# Patient Record
Sex: FEMALE | Race: WHITE | NOT HISPANIC OR LATINO | Employment: UNEMPLOYED | URBAN - NONMETROPOLITAN AREA
[De-identification: names, ages, dates, MRNs, and addresses within clinical notes are randomized per-mention and may not be internally consistent; named-entity substitution may affect disease eponyms.]

---

## 2017-01-11 ENCOUNTER — TRANSCRIBE ORDERS (OUTPATIENT)
Dept: ADMINISTRATIVE | Facility: HOSPITAL | Age: 37
End: 2017-01-11

## 2017-01-11 ENCOUNTER — HOSPITAL ENCOUNTER (OUTPATIENT)
Dept: GENERAL RADIOLOGY | Facility: HOSPITAL | Age: 37
Discharge: HOME | End: 2017-01-11
Admitting: PHYSICIAN ASSISTANT

## 2017-01-11 DIAGNOSIS — M54.5 LOW BACK PAIN, UNSPECIFIED BACK PAIN LATERALITY, UNSPECIFIED CHRONICITY, WITH SCIATICA PRESENCE UNSPECIFIED: Primary | ICD-10-CM

## 2017-01-11 DIAGNOSIS — M54.5 LOW BACK PAIN, UNSPECIFIED BACK PAIN LATERALITY, UNSPECIFIED CHRONICITY, WITH SCIATICA PRESENCE UNSPECIFIED: ICD-10-CM

## 2017-01-11 PROCEDURE — 72110 X-RAY EXAM L-2 SPINE 4/>VWS: CPT | Performed by: RADIOLOGY

## 2017-01-11 PROCEDURE — 72110 X-RAY EXAM L-2 SPINE 4/>VWS: CPT

## 2017-12-11 ENCOUNTER — HOSPITAL ENCOUNTER (OUTPATIENT)
Dept: GENERAL RADIOLOGY | Facility: HOSPITAL | Age: 37
Discharge: HOME | End: 2017-12-11
Admitting: NURSE PRACTITIONER

## 2017-12-11 ENCOUNTER — TRANSCRIBE ORDERS (OUTPATIENT)
Dept: ADMINISTRATIVE | Facility: HOSPITAL | Age: 37
End: 2017-12-11

## 2017-12-11 DIAGNOSIS — M54.9 BACK PAIN, UNSPECIFIED BACK LOCATION, UNSPECIFIED BACK PAIN LATERALITY, UNSPECIFIED CHRONICITY: ICD-10-CM

## 2017-12-11 DIAGNOSIS — M54.9 BACK PAIN, UNSPECIFIED BACK LOCATION, UNSPECIFIED BACK PAIN LATERALITY, UNSPECIFIED CHRONICITY: Primary | ICD-10-CM

## 2017-12-11 PROCEDURE — 72202 X-RAY EXAM SI JOINTS 3/> VWS: CPT | Performed by: RADIOLOGY

## 2017-12-11 PROCEDURE — 72202 X-RAY EXAM SI JOINTS 3/> VWS: CPT

## 2019-12-30 ENCOUNTER — HOSPITAL ENCOUNTER (EMERGENCY)
Facility: HOSPITAL | Age: 39
Discharge: HOME | End: 2019-12-30
Attending: FAMILY MEDICINE | Admitting: FAMILY MEDICINE

## 2019-12-30 ENCOUNTER — APPOINTMENT (OUTPATIENT)
Dept: GENERAL RADIOLOGY | Facility: HOSPITAL | Age: 39
End: 2019-12-30

## 2019-12-30 VITALS
WEIGHT: 215 LBS | HEART RATE: 98 BPM | HEIGHT: 64 IN | OXYGEN SATURATION: 98 % | DIASTOLIC BLOOD PRESSURE: 79 MMHG | SYSTOLIC BLOOD PRESSURE: 116 MMHG | TEMPERATURE: 98.5 F | BODY MASS INDEX: 36.7 KG/M2 | RESPIRATION RATE: 18 BRPM

## 2019-12-30 DIAGNOSIS — S90.111A CONTUSION OF RIGHT GREAT TOE WITHOUT DAMAGE TO NAIL, INITIAL ENCOUNTER: Primary | ICD-10-CM

## 2019-12-30 DIAGNOSIS — S90.121A CONTUSION OF SECOND TOE OF RIGHT FOOT, INITIAL ENCOUNTER: ICD-10-CM

## 2019-12-30 PROCEDURE — 73630 X-RAY EXAM OF FOOT: CPT

## 2019-12-30 PROCEDURE — 99283 EMERGENCY DEPT VISIT LOW MDM: CPT

## 2019-12-30 PROCEDURE — 73630 X-RAY EXAM OF FOOT: CPT | Performed by: RADIOLOGY

## 2019-12-30 RX ORDER — IBUPROFEN 400 MG/1
800 TABLET ORAL ONCE
Status: COMPLETED | OUTPATIENT
Start: 2019-12-30 | End: 2019-12-30

## 2019-12-30 RX ORDER — IBUPROFEN 800 MG/1
800 TABLET ORAL EVERY 8 HOURS PRN
Qty: 30 TABLET | Refills: 0 | OUTPATIENT
Start: 2019-12-30 | End: 2022-03-09

## 2019-12-30 RX ADMIN — IBUPROFEN 800 MG: 400 TABLET, FILM COATED ORAL at 12:52

## 2022-03-09 ENCOUNTER — APPOINTMENT (OUTPATIENT)
Dept: GENERAL RADIOLOGY | Facility: HOSPITAL | Age: 42
End: 2022-03-09

## 2022-03-09 ENCOUNTER — HOSPITAL ENCOUNTER (EMERGENCY)
Facility: HOSPITAL | Age: 42
Discharge: HOME | End: 2022-03-09
Attending: STUDENT IN AN ORGANIZED HEALTH CARE EDUCATION/TRAINING PROGRAM | Admitting: STUDENT IN AN ORGANIZED HEALTH CARE EDUCATION/TRAINING PROGRAM

## 2022-03-09 VITALS
WEIGHT: 235 LBS | HEART RATE: 102 BPM | RESPIRATION RATE: 16 BRPM | TEMPERATURE: 98.4 F | SYSTOLIC BLOOD PRESSURE: 142 MMHG | HEIGHT: 65 IN | DIASTOLIC BLOOD PRESSURE: 82 MMHG | OXYGEN SATURATION: 97 % | BODY MASS INDEX: 39.15 KG/M2

## 2022-03-09 DIAGNOSIS — S86.911A KNEE STRAIN, RIGHT, INITIAL ENCOUNTER: Primary | ICD-10-CM

## 2022-03-09 PROCEDURE — 73562 X-RAY EXAM OF KNEE 3: CPT

## 2022-03-09 PROCEDURE — 96372 THER/PROPH/DIAG INJ SC/IM: CPT

## 2022-03-09 PROCEDURE — 73562 X-RAY EXAM OF KNEE 3: CPT | Performed by: RADIOLOGY

## 2022-03-09 PROCEDURE — 99283 EMERGENCY DEPT VISIT LOW MDM: CPT

## 2022-03-09 PROCEDURE — 25010000002 KETOROLAC TROMETHAMINE PER 15 MG: Performed by: PHYSICIAN ASSISTANT

## 2022-03-09 RX ORDER — KETOROLAC TROMETHAMINE 10 MG/1
10 TABLET, FILM COATED ORAL EVERY 6 HOURS PRN
Qty: 20 TABLET | Refills: 0 | Status: SHIPPED | OUTPATIENT
Start: 2022-03-09 | End: 2022-03-14

## 2022-03-09 RX ORDER — KETOROLAC TROMETHAMINE 30 MG/ML
60 INJECTION, SOLUTION INTRAMUSCULAR; INTRAVENOUS ONCE
Status: COMPLETED | OUTPATIENT
Start: 2022-03-09 | End: 2022-03-09

## 2022-03-09 RX ADMIN — KETOROLAC TROMETHAMINE 60 MG: 30 INJECTION, SOLUTION INTRAMUSCULAR; INTRAVENOUS at 16:12

## 2022-03-09 NOTE — DISCHARGE INSTRUCTIONS
Please utilize your splint, crutches and toradol for pain relief. Please follow up with ortho or return to ER if symptoms worsen.

## 2022-03-09 NOTE — ED PROVIDER NOTES
Subjective   This is a 41 year old female patient who presents to the ER with chief complaint of right knee injury. No PMH. She was standing up from bending deep working in cabinets. Since that time, she has had non-radiating right anterior knee pain that is aching with associated swelling. No numbness/tingling. No redness or heat. Can ambulate.           Review of Systems   Constitutional: Negative.  Negative for fever.   HENT: Negative.    Respiratory: Negative.    Cardiovascular: Negative.  Negative for chest pain.   Gastrointestinal: Negative.  Negative for abdominal pain.   Endocrine: Negative.    Genitourinary: Negative.  Negative for dysuria.   Musculoskeletal: Negative for arthralgias, back pain, gait problem, joint swelling, myalgias, neck pain and neck stiffness.        Right knee injury    Skin: Negative.    Neurological: Negative.    Psychiatric/Behavioral: Negative.    All other systems reviewed and are negative.      No past medical history on file.    Allergies   Allergen Reactions   • Augmentin [Amoxicillin-Pot Clavulanate] Other (See Comments)     Causes UTI   • Codeine Other (See Comments)     Told as a child   • Erythromycin Other (See Comments)     Told as a child       No past surgical history on file.    No family history on file.    Social History     Socioeconomic History   • Marital status:            Objective   Physical Exam  Vitals and nursing note reviewed.   Constitutional:       General: She is not in acute distress.     Appearance: She is well-developed. She is not diaphoretic.   HENT:      Head: Normocephalic and atraumatic.      Right Ear: External ear normal.      Left Ear: External ear normal.      Nose: Nose normal.   Eyes:      Conjunctiva/sclera: Conjunctivae normal.      Pupils: Pupils are equal, round, and reactive to light.   Neck:      Vascular: No JVD.      Trachea: No tracheal deviation.   Cardiovascular:      Rate and Rhythm: Normal rate and regular rhythm.       Heart sounds: Normal heart sounds. No murmur heard.  Pulmonary:      Effort: Pulmonary effort is normal. No respiratory distress.      Breath sounds: Normal breath sounds. No wheezing.   Abdominal:      General: Bowel sounds are normal.      Palpations: Abdomen is soft.      Tenderness: There is no abdominal tenderness.   Musculoskeletal:         General: Swelling, tenderness and signs of injury present. No deformity. Normal range of motion.      Cervical back: Normal range of motion and neck supple.      Comments: Right knee skin intact with no bruising, abrasion. Edema without erythema noted as well as tenderness to palpation. ROM is active but limited and painful. Neurovascular status and sensation RLE intact.    Skin:     General: Skin is warm and dry.      Coloration: Skin is not pale.      Findings: No erythema or rash.   Neurological:      Mental Status: She is alert and oriented to person, place, and time.      Cranial Nerves: No cranial nerve deficit.   Psychiatric:         Behavior: Behavior normal.         Thought Content: Thought content normal.         Procedures           ED Course  ED Course as of 03/09/22 1556   Wed Mar 09, 2022   1531 XR Knee 3 View Right  IMPRESSION:    No acute findings in the right knee.     This report was finalized on 3/9/2022 3:19 PM by Dr. Geoff Aguilar MD. [MM]   1551 Patient diagnosed with right knee strain. Will be d/c home in splint with crutches and rx for toradol. Will f/u with ortho in 2 days or return to ER if symptoms worsen.  [MM]      ED Course User Index  [MM] Alize Corona PA                                                 MDM  Number of Diagnoses or Management Options     Amount and/or Complexity of Data Reviewed  Tests in the radiology section of CPT®: ordered and reviewed  Discuss the patient with other providers: yes        Final diagnoses:   Knee strain, right, initial encounter       ED Disposition  ED Disposition     ED Disposition   Discharge     Condition   Stable    Comment   --             Ino James MD  [address] [place] [address] [phone]    In 2 days           Medication List      New Prescriptions    ketorolac 10 MG tablet  Commonly known as: TORADOL  Take 1 tablet by mouth Every 6 (Six) Hours As Needed for Moderate Pain  for up to 5 days.        Stop    ibuprofen 800 MG tablet  Commonly known as: ADVIL,MOTRIN           Where to Get Your Medications      These medications were sent to [facility] [place] - [address] - [phone]  - [phone]   [address] [place] [address]    Phone: [phone]   · ketorolac 10 MG tablet          Alize Corona PA  03/09/22 3123

## 2025-04-24 ENCOUNTER — HOSPITAL ENCOUNTER (EMERGENCY)
Facility: HOSPITAL | Age: 45
Discharge: HOME | End: 2025-04-24
Attending: EMERGENCY MEDICINE

## 2025-04-24 ENCOUNTER — APPOINTMENT (OUTPATIENT)
Dept: CT IMAGING | Facility: HOSPITAL | Age: 45
End: 2025-04-24

## 2025-04-24 ENCOUNTER — APPOINTMENT (OUTPATIENT)
Dept: ULTRASOUND IMAGING | Facility: HOSPITAL | Age: 45
End: 2025-04-24

## 2025-04-24 VITALS
SYSTOLIC BLOOD PRESSURE: 147 MMHG | RESPIRATION RATE: 16 BRPM | HEIGHT: 65 IN | HEART RATE: 84 BPM | OXYGEN SATURATION: 98 % | WEIGHT: 249 LBS | TEMPERATURE: 97.9 F | DIASTOLIC BLOOD PRESSURE: 91 MMHG | BODY MASS INDEX: 41.48 KG/M2

## 2025-04-24 DIAGNOSIS — M54.32 SCIATICA OF LEFT SIDE: ICD-10-CM

## 2025-04-24 DIAGNOSIS — M51.369 L4-L5 DISC BULGE: Primary | ICD-10-CM

## 2025-04-24 PROCEDURE — 72131 CT LUMBAR SPINE W/O DYE: CPT | Performed by: RADIOLOGY

## 2025-04-24 PROCEDURE — 99284 EMERGENCY DEPT VISIT MOD MDM: CPT

## 2025-04-24 PROCEDURE — 93971 EXTREMITY STUDY: CPT | Performed by: RADIOLOGY

## 2025-04-24 PROCEDURE — 93971 EXTREMITY STUDY: CPT

## 2025-04-24 PROCEDURE — 72131 CT LUMBAR SPINE W/O DYE: CPT

## 2025-04-24 RX ORDER — KETOROLAC TROMETHAMINE 10 MG/1
10 TABLET, FILM COATED ORAL ONCE
Status: COMPLETED | OUTPATIENT
Start: 2025-04-24 | End: 2025-04-24

## 2025-04-24 RX ORDER — DICLOFENAC SODIUM 75 MG/1
75 TABLET, DELAYED RELEASE ORAL 2 TIMES DAILY
Qty: 14 TABLET | Refills: 0 | Status: SHIPPED | OUTPATIENT
Start: 2025-04-24

## 2025-04-24 RX ORDER — METHYLPREDNISOLONE 4 MG/1
TABLET ORAL
Qty: 21 TABLET | Refills: 0 | Status: SHIPPED | OUTPATIENT
Start: 2025-04-24

## 2025-04-24 RX ADMIN — KETOROLAC TROMETHAMINE 10 MG: 10 TABLET, FILM COATED ORAL at 12:19

## 2025-04-24 NOTE — Clinical Note
[facility] EMERGENCY DEPARTMENT  [address] [address]  Phone: [phone]    Alex Daugherty accompanied Elaina Daugherty to the emergency department on 4/24/2025. They may return to work on 04/25/2025.        Thank you for choosing [facility].    Dory Rosado PA-C

## 2025-04-24 NOTE — Clinical Note
[facility] EMERGENCY DEPARTMENT  [address] [address]  Phone: [phone]    Alex Daugherty accompanied Elaina Daugherty to the emergency department on 4/24/2025. They may return to work on 04/25/2025.        Thank you for choosing [facility] [place].    Dory Rosado PA-C

## 2025-04-24 NOTE — ED PROVIDER NOTES
Subjective   History of Present Illness  44-year-old female with no known past medical history presents to the emergency room with left leg pain x 3 days.  Patient states that she has been having lower back pain which has been radiating down into her leg.  She states that she has a history of a herniated disc and states on occasion it flares.  She states 3 days ago she was on the toilet and when trying to get up developed a shooting pain down her left leg and states since that time her leg has been hurting and she has been very uncomfortable.  She denies any urinary retention, urinary incontinence, fecal incontinence, saddle anesthesia, lower extremity weakness, current or previous history of IV drug abuse, or history of malignancy.  Aggravating factors include ambulation.  Denies any alleviating factors despite rest.  Denies any other complaints or concerns at this time.    History provided by:  Patient   used: No        Review of Systems   Constitutional: Negative.  Negative for fever.   HENT: Negative.     Respiratory: Negative.     Cardiovascular: Negative.  Negative for chest pain.   Gastrointestinal: Negative.  Negative for abdominal pain.   Endocrine: Negative.    Genitourinary: Negative.  Negative for dysuria.   Musculoskeletal:  Positive for back pain.        (+) left leg pain   Skin: Negative.    Neurological: Negative.    Psychiatric/Behavioral: Negative.     All other systems reviewed and are negative.      No past medical history on file.    Allergies   Allergen Reactions    Augmentin [Amoxicillin-Pot Clavulanate] Other (See Comments)     Causes UTI    Codeine Other (See Comments)     Told as a child    Erythromycin Other (See Comments)     Told as a child       No past surgical history on file.    No family history on file.    Social History     Socioeconomic History    Marital status:            Objective   Physical Exam  Vitals and nursing note reviewed.   Constitutional:        General: She is not in acute distress.     Appearance: She is well-developed. She is not diaphoretic.   HENT:      Head: Normocephalic and atraumatic.      Right Ear: External ear normal.      Left Ear: External ear normal.      Nose: Nose normal.   Eyes:      Conjunctiva/sclera: Conjunctivae normal.      Pupils: Pupils are equal, round, and reactive to light.   Neck:      Vascular: No JVD.      Trachea: No tracheal deviation.   Cardiovascular:      Rate and Rhythm: Normal rate and regular rhythm.      Heart sounds: Normal heart sounds. No murmur heard.  Pulmonary:      Effort: Pulmonary effort is normal. No respiratory distress.      Breath sounds: Normal breath sounds. No wheezing.   Abdominal:      General: Bowel sounds are normal.      Palpations: Abdomen is soft.      Tenderness: There is no abdominal tenderness.   Musculoskeletal:         General: No deformity. Normal range of motion.      Cervical back: Normal, normal range of motion and neck supple.      Thoracic back: Normal.      Lumbar back: No swelling. Positive left straight leg raise test.   Skin:     General: Skin is warm and dry.      Coloration: Skin is not pale.      Findings: No erythema or rash.   Neurological:      Mental Status: She is alert and oriented to person, place, and time.      Cranial Nerves: No cranial nerve deficit.   Psychiatric:         Behavior: Behavior normal.         Thought Content: Thought content normal.         Procedures           ED Course  ED Course as of 04/24/25 2229   Thu Apr 24, 2025   1237 US Venous Doppler Lower Extremity Left (duplex) [TK]   1340 CT Lumbar Spine Without Contrast [TK]      ED Course User Index  [TK] Dory Rosado, MICHAEL                                           CT Lumbar Spine Without Contrast   Final Result   1.  No fracture or traumatic malalignment identified.   2.  Broad-based posterior disc bulge at L4/5 with superimposed   left-sided disc protrusion with mild central canal and  mild-moderate   neuroforaminal stenosis.   3.  Broad-based posterior disc bulge at L5/S1 with mild central canal   and mild neuroforaminal stenosis.   4.  Nonobstructing left kidney stone.       This report was finalized on 4/24/2025 1:30 PM by Dr. Ino Rivera MD.          US Venous Doppler Lower Extremity Left (duplex)   Final Result     No evidence of DVT.       This report was finalized on 4/24/2025 12:31 PM by Dr. Ino Rivera MD.                          Medical Decision Making  Uncomplicated ED course.  Patient found to have a herniated disc at L5-S1 on the left and a broad-based posterior disc bulge at L5-S1.  I have recommended that patient follow-up outpatient with Dr. Rosado.  Patient has been given Toradol in the ED with mild relief.  The patient has been encouraged to return to the emergency room with any new or worsening symptomatology.    Problems Addressed:  L4-L5 disc bulge: complicated acute illness or injury  Sciatica of left side: complicated acute illness or injury    Amount and/or Complexity of Data Reviewed  Radiology: ordered. Decision-making details documented in ED Course.    Risk  Prescription drug management.        Final diagnoses:   L4-L5 disc bulge   Sciatica of left side       ED Disposition  ED Disposition       ED Disposition   Discharge    Condition   Stable    Comment   --               Segundo Rosado DO  [address] [place] [id number] [phone]    In 2 days           Medication List        New Prescriptions      diclofenac 75 MG EC tablet  Commonly known as: VOLTAREN  Take 1 tablet by mouth 2 (Two) Times a Day.     methylPREDNISolone 4 MG dose pack  Commonly known as: MEDROL  Take as directed on package instructions.               Where to Get Your Medications        These medications were sent to [facility] - [place], [place] - [address] - [phone]  - [phone]  [address] [place] [address]       Phone: [phone]   diclofenac 75 MG EC tablet  methylPREDNISolone 4 MG dose pack            Dory Rosado PA-C  04/24/25 2150

## 2025-08-21 ENCOUNTER — HOSPITAL ENCOUNTER (EMERGENCY)
Facility: HOSPITAL | Age: 45
Discharge: HOME | End: 2025-08-21

## 2025-08-25 ENCOUNTER — TELEPHONE (OUTPATIENT)
Dept: EMERGENCY DEPT | Facility: HOSPITAL | Age: 45
End: 2025-08-25